# Patient Record
Sex: MALE | Race: WHITE | Employment: STUDENT | URBAN - METROPOLITAN AREA
[De-identification: names, ages, dates, MRNs, and addresses within clinical notes are randomized per-mention and may not be internally consistent; named-entity substitution may affect disease eponyms.]

---
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
## 2021-04-03 ENCOUNTER — TELEPHONE (OUTPATIENT)
Dept: ALLERGY | Facility: CLINIC | Age: 21
End: 2021-04-03

## 2021-04-03 NOTE — TELEPHONE ENCOUNTER
Refill requested for   Montelukast Sodium (SINGULAIR) 10 MG Oral Tab 30 tablet 0 12/23/2020    Sig:   Take 1 tablet (10 mg total) by mouth nightly.      Route:   Oral         Last office visit: 12/23/20    Previously advised to follow up in:  Please clarify

## 2021-04-05 RX ORDER — MONTELUKAST SODIUM 10 MG/1
10 TABLET ORAL NIGHTLY
Qty: 30 TABLET | Refills: 0 | Status: SHIPPED | OUTPATIENT
Start: 2021-04-05 | End: 2021-05-07

## 2021-04-05 NOTE — TELEPHONE ENCOUNTER
Follow up scheduled for June 1, 2021 when patient returns from school. Notified he needs referral. Will request from PCP.

## 2021-05-07 RX ORDER — MONTELUKAST SODIUM 10 MG/1
TABLET ORAL
Qty: 30 TABLET | Refills: 0 | Status: SHIPPED | OUTPATIENT
Start: 2021-05-07 | End: 2021-06-01

## 2021-05-07 NOTE — TELEPHONE ENCOUNTER
Refill requested for:   Name from pharmacy: MONTELUKAST SOD 10 MG TABLET         Will file in chart as: MONTELUKAST SODIUM 10 MG Oral Tab    Sig: TAKE 1 TABLET BY MOUTH EVERY DAY AT NIGHT    Disp:  30 tablet    Refills:  0 (Pharmacy requested: Not specifie

## 2021-05-11 ENCOUNTER — OFFICE VISIT (OUTPATIENT)
Dept: FAMILY MEDICINE CLINIC | Facility: CLINIC | Age: 21
End: 2021-05-11

## 2021-05-11 VITALS
TEMPERATURE: 98 F | HEART RATE: 70 BPM | DIASTOLIC BLOOD PRESSURE: 68 MMHG | BODY MASS INDEX: 20.19 KG/M2 | SYSTOLIC BLOOD PRESSURE: 120 MMHG | HEIGHT: 70.5 IN | WEIGHT: 142.63 LBS

## 2021-05-11 DIAGNOSIS — Z00.00 ADULT GENERAL MEDICAL EXAM: Primary | ICD-10-CM

## 2021-05-11 DIAGNOSIS — Z23 NEED FOR VACCINATION: ICD-10-CM

## 2021-05-11 DIAGNOSIS — J30.1 ALLERGIC RHINITIS DUE TO POLLEN, UNSPECIFIED SEASONALITY: ICD-10-CM

## 2021-05-11 DIAGNOSIS — L70.0 ACNE VULGARIS: ICD-10-CM

## 2021-05-11 PROCEDURE — 99395 PREV VISIT EST AGE 18-39: CPT | Performed by: FAMILY MEDICINE

## 2021-05-11 PROCEDURE — 90471 IMMUNIZATION ADMIN: CPT | Performed by: FAMILY MEDICINE

## 2021-05-11 PROCEDURE — 90620 MENB-4C VACCINE IM: CPT | Performed by: FAMILY MEDICINE

## 2021-05-11 PROCEDURE — 3074F SYST BP LT 130 MM HG: CPT | Performed by: FAMILY MEDICINE

## 2021-05-11 PROCEDURE — 3078F DIAST BP <80 MM HG: CPT | Performed by: FAMILY MEDICINE

## 2021-05-11 PROCEDURE — 3008F BODY MASS INDEX DOCD: CPT | Performed by: FAMILY MEDICINE

## 2021-05-11 RX ORDER — ERYTHROMYCIN AND BENZOYL PEROXIDE 30; 50 MG/G; MG/G
1 GEL TOPICAL 2 TIMES DAILY
Qty: 46 G | Refills: 0 | Status: SHIPPED | OUTPATIENT
Start: 2021-05-11 | End: 2022-05-06

## 2021-05-11 RX ORDER — FLUTICASONE PROPIONATE 50 MCG
2 SPRAY, SUSPENSION (ML) NASAL DAILY
Qty: 3 BOTTLE | Refills: 1 | Status: SHIPPED | OUTPATIENT
Start: 2021-05-11 | End: 2021-06-01

## 2021-05-11 RX ORDER — MONTELUKAST SODIUM 10 MG/1
10 TABLET ORAL NIGHTLY
Qty: 90 TABLET | Refills: 1 | Status: SHIPPED | OUTPATIENT
Start: 2021-05-11 | End: 2021-06-01

## 2021-05-11 NOTE — PROGRESS NOTES
Patient ID: Kit Grossman is a 24year old male. HPI  Patient presents with:  Routine Physical  Referral: Dermatology and Allergy    Last physical on 5/15/2020. Pt presents today with his father.      Pt is transferring from the [address] [place] [address] 6 Encounters:  05/11/21 : 20.17 kg/m² (16 %, Z= -1.01)*  12/23/20 : 21.51 kg/m² (36 %, Z= -0.37)*  05/15/20 : 20.77 kg/m² (30 %, Z= -0.53)*  05/20/19 : 18.24 kg/m² (7 %, Z= -1.49)*  03/18/19 : 19.21 kg/m² (18 %, Z= -0.92)*  07/19/18 : 17.59 kg/m² (6 %, Z= Transportation Needs:       Lack of Transportation (Medical):       Lack of Transportation (Non-Medical):   Physical Activity:       Days of Exercise per Week:       Minutes of Exercise per Session:   Stress:       Feeling of Stress :   Social Connection distress. Abdominal: Soft. Bowel sounds are normal. There is no hepatosplenomegaly. There is no tenderness. Lymphadenopathy: He has no cervical adenopathy. Neurological: He is alert and oriented to person, place, and time. He has normal reflexes.  No Referral Type:OFFICE VISIT          Referred to Maggie Boyd MD          Requested Specialty:DERMATOLOGY          Number of Visits Requested:3      [facility] 2019          Order Comments:              See DR Denia Benton (ALLERGIST)

## 2021-05-26 ENCOUNTER — TELEPHONE (OUTPATIENT)
Dept: FAMILY MEDICINE CLINIC | Facility: CLINIC | Age: 21
End: 2021-05-26

## 2021-06-01 ENCOUNTER — OFFICE VISIT (OUTPATIENT)
Dept: ALLERGY | Facility: CLINIC | Age: 21
End: 2021-06-01

## 2021-06-01 VITALS
SYSTOLIC BLOOD PRESSURE: 122 MMHG | HEART RATE: 62 BPM | WEIGHT: 144 LBS | BODY MASS INDEX: 20 KG/M2 | DIASTOLIC BLOOD PRESSURE: 73 MMHG

## 2021-06-01 DIAGNOSIS — J30.89 PERENNIAL ALLERGIC RHINITIS WITH SEASONAL VARIATION: Primary | ICD-10-CM

## 2021-06-01 DIAGNOSIS — J30.2 PERENNIAL ALLERGIC RHINITIS WITH SEASONAL VARIATION: Primary | ICD-10-CM

## 2021-06-01 DIAGNOSIS — J30.89 ENVIRONMENTAL AND SEASONAL ALLERGIES: ICD-10-CM

## 2021-06-01 DIAGNOSIS — J30.1 ALLERGIC RHINITIS DUE TO POLLEN, UNSPECIFIED SEASONALITY: ICD-10-CM

## 2021-06-01 PROCEDURE — 3074F SYST BP LT 130 MM HG: CPT | Performed by: ALLERGY & IMMUNOLOGY

## 2021-06-01 PROCEDURE — 99213 OFFICE O/P EST LOW 20 MIN: CPT | Performed by: ALLERGY & IMMUNOLOGY

## 2021-06-01 PROCEDURE — 3078F DIAST BP <80 MM HG: CPT | Performed by: ALLERGY & IMMUNOLOGY

## 2021-06-01 RX ORDER — MONTELUKAST SODIUM 10 MG/1
10 TABLET ORAL NIGHTLY
Qty: 90 TABLET | Refills: 1 | Status: SHIPPED | OUTPATIENT
Start: 2021-06-01 | End: 2021-11-28

## 2021-06-01 RX ORDER — FLUTICASONE PROPIONATE 50 MCG
2 SPRAY, SUSPENSION (ML) NASAL DAILY
Qty: 3 EACH | Refills: 3 | Status: SHIPPED | OUTPATIENT
Start: 2021-06-01

## 2021-06-01 NOTE — PATIENT INSTRUCTIONS
Recs:  Continue with Singulair, montelukast 10 mg once a day. Denies side effects. Reviewed FDA warning  Add Flonase or Nasacort 2 sprays per nostril once a day.   May take up to 3 to 5 days to take full effect and should be used daily during peak allergy

## 2021-06-01 NOTE — PROGRESS NOTES
Nely Ceja is a 24year old male. HPI:   Patient presents with: Allergies: congestion/sneezing has been better with singulair     Patient is a 52-year-old male who presents for follow-up with a chief complaint of allergies.     Patient last seen irritability and lethargy  ENMT:  Negative for ear drainage, hearing loss.  See hpi   Eyes:  Negative for eye discharge and vision loss  Gastrointestinal:  Negative for abdominal pain, diarrhea and vomiting  Integumentary:  Negative for pruritus and rash  R were provided today, they were provided solely for patient education and training related to self administration of these medications. Teaching, instruction and sample was provided to the patient by myself.   Teaching included  a review of potential advers

## 2021-09-20 ENCOUNTER — TELEPHONE (OUTPATIENT)
Dept: FAMILY MEDICINE CLINIC | Facility: CLINIC | Age: 21
End: 2021-09-20

## 2021-09-20 NOTE — TELEPHONE ENCOUNTER
Spoke with pt's mom (not on hipaa),  verified. She stated pt is currently in [place] for school. Pt has cough, some sinus issue but his cough is getting worse. Pt is vaccinated and can't miss his classes.   She wants to know if pt can have a video w

## 2022-03-12 ENCOUNTER — OFFICE VISIT (OUTPATIENT)
Dept: FAMILY MEDICINE CLINIC | Facility: CLINIC | Age: 22
End: 2022-03-12
Payer: COMMERCIAL

## 2022-03-12 VITALS
HEIGHT: 70.5 IN | BODY MASS INDEX: 20.9 KG/M2 | WEIGHT: 147.63 LBS | TEMPERATURE: 97 F | HEART RATE: 75 BPM | SYSTOLIC BLOOD PRESSURE: 126 MMHG | DIASTOLIC BLOOD PRESSURE: 80 MMHG

## 2022-03-12 DIAGNOSIS — H10.13 ALLERGIC CONJUNCTIVITIS OF BOTH EYES: ICD-10-CM

## 2022-03-12 DIAGNOSIS — J30.89 OTHER ALLERGIC RHINITIS: ICD-10-CM

## 2022-03-12 DIAGNOSIS — J32.9 SINUSITIS, UNSPECIFIED CHRONICITY, UNSPECIFIED LOCATION: Primary | ICD-10-CM

## 2022-03-12 DIAGNOSIS — R05.9 COUGH: ICD-10-CM

## 2022-03-12 PROCEDURE — 3079F DIAST BP 80-89 MM HG: CPT | Performed by: FAMILY MEDICINE

## 2022-03-12 PROCEDURE — 3008F BODY MASS INDEX DOCD: CPT | Performed by: FAMILY MEDICINE

## 2022-03-12 PROCEDURE — 3074F SYST BP LT 130 MM HG: CPT | Performed by: FAMILY MEDICINE

## 2022-03-12 PROCEDURE — 99214 OFFICE O/P EST MOD 30 MIN: CPT | Performed by: FAMILY MEDICINE

## 2022-03-12 RX ORDER — AMOXICILLIN AND CLAVULANATE POTASSIUM 875; 125 MG/1; MG/1
1 TABLET, FILM COATED ORAL 2 TIMES DAILY
Qty: 20 TABLET | Refills: 0 | Status: SHIPPED | OUTPATIENT
Start: 2022-03-12 | End: 2022-03-22

## 2022-03-15 ENCOUNTER — OFFICE VISIT (OUTPATIENT)
Dept: ALLERGY | Facility: CLINIC | Age: 22
End: 2022-03-15
Payer: COMMERCIAL

## 2022-03-15 VITALS
OXYGEN SATURATION: 97 % | DIASTOLIC BLOOD PRESSURE: 75 MMHG | RESPIRATION RATE: 20 BRPM | SYSTOLIC BLOOD PRESSURE: 120 MMHG | HEART RATE: 61 BPM

## 2022-03-15 DIAGNOSIS — J30.1 ALLERGIC RHINITIS DUE TO POLLEN, UNSPECIFIED SEASONALITY: ICD-10-CM

## 2022-03-15 DIAGNOSIS — Z92.29 COVID-19 VACCINE SERIES COMPLETED: ICD-10-CM

## 2022-03-15 DIAGNOSIS — J30.2 PERENNIAL ALLERGIC RHINITIS WITH SEASONAL VARIATION: Primary | ICD-10-CM

## 2022-03-15 DIAGNOSIS — J30.89 PERENNIAL ALLERGIC RHINITIS WITH SEASONAL VARIATION: Primary | ICD-10-CM

## 2022-03-15 DIAGNOSIS — Z23 FLU VACCINE NEED: ICD-10-CM

## 2022-03-15 PROCEDURE — 99214 OFFICE O/P EST MOD 30 MIN: CPT | Performed by: ALLERGY & IMMUNOLOGY

## 2022-03-15 PROCEDURE — 3074F SYST BP LT 130 MM HG: CPT | Performed by: ALLERGY & IMMUNOLOGY

## 2022-03-15 PROCEDURE — 3078F DIAST BP <80 MM HG: CPT | Performed by: ALLERGY & IMMUNOLOGY

## 2022-03-15 RX ORDER — MONTELUKAST SODIUM 10 MG/1
TABLET ORAL
COMMUNITY
Start: 2022-03-14

## 2022-03-15 RX ORDER — LEVOCETIRIZINE DIHYDROCHLORIDE 5 MG/1
5 TABLET, FILM COATED ORAL NIGHTLY
Qty: 90 TABLET | Refills: 1 | Status: SHIPPED | OUTPATIENT
Start: 2022-03-15

## 2022-05-03 PROBLEM — F41.9 ANXIETY: Status: ACTIVE | Noted: 2022-05-03

## 2022-05-03 PROBLEM — F32.A DEPRESSIVE DISORDER: Status: ACTIVE | Noted: 2022-05-03

## 2022-05-03 PROBLEM — F51.5 NIGHTMARES: Status: ACTIVE | Noted: 2022-05-03

## 2022-05-24 ENCOUNTER — OFFICE VISIT (OUTPATIENT)
Dept: FAMILY MEDICINE CLINIC | Facility: CLINIC | Age: 22
End: 2022-05-24
Payer: COMMERCIAL

## 2022-05-24 VITALS
OXYGEN SATURATION: 98 % | HEART RATE: 70 BPM | TEMPERATURE: 98 F | WEIGHT: 149 LBS | HEIGHT: 69 IN | DIASTOLIC BLOOD PRESSURE: 66 MMHG | SYSTOLIC BLOOD PRESSURE: 118 MMHG | RESPIRATION RATE: 20 BRPM | BODY MASS INDEX: 22.07 KG/M2

## 2022-05-24 DIAGNOSIS — L23.7 CONTACT DERMATITIS DUE TO POISON SUMAC: Primary | ICD-10-CM

## 2022-05-24 PROCEDURE — 99202 OFFICE O/P NEW SF 15 MIN: CPT | Performed by: PHYSICIAN ASSISTANT

## 2022-05-24 PROCEDURE — 3008F BODY MASS INDEX DOCD: CPT | Performed by: PHYSICIAN ASSISTANT

## 2022-05-24 PROCEDURE — 3078F DIAST BP <80 MM HG: CPT | Performed by: PHYSICIAN ASSISTANT

## 2022-05-24 PROCEDURE — 3074F SYST BP LT 130 MM HG: CPT | Performed by: PHYSICIAN ASSISTANT

## 2022-05-24 RX ORDER — BETAMETHASONE DIPROPIONATE 0.05 %
OINTMENT (GRAM) TOPICAL
Qty: 15 G | Refills: 1 | Status: SHIPPED | OUTPATIENT
Start: 2022-05-24

## 2022-05-24 RX ORDER — PREDNISONE 20 MG/1
TABLET ORAL
Qty: 13 TABLET | Refills: 0 | Status: SHIPPED | OUTPATIENT
Start: 2022-05-24 | End: 2022-06-01

## 2022-06-21 RX ORDER — MONTELUKAST SODIUM 10 MG/1
TABLET ORAL
Qty: 90 TABLET | Refills: 1 | Status: SHIPPED | OUTPATIENT
Start: 2022-06-21

## 2022-07-21 RX ORDER — METHYLPHENIDATE HYDROCHLORIDE 27 MG/1
27 TABLET ORAL DAILY
Qty: 30 TABLET | Refills: 0 | Status: SHIPPED | OUTPATIENT
Start: 2022-07-21 | End: 2022-08-20

## 2022-07-24 DIAGNOSIS — F51.5 NIGHTMARES: ICD-10-CM

## 2022-07-24 DIAGNOSIS — F41.9 ANXIETY: ICD-10-CM

## 2022-07-24 DIAGNOSIS — F32.A DEPRESSIVE DISORDER: ICD-10-CM

## 2022-07-25 RX ORDER — ESCITALOPRAM OXALATE 10 MG/1
10 TABLET ORAL DAILY
Qty: 90 TABLET | Refills: 0 | Status: SHIPPED | OUTPATIENT
Start: 2022-07-25

## 2022-09-02 ENCOUNTER — TELEPHONE (OUTPATIENT)
Dept: FAMILY MEDICINE CLINIC | Facility: CLINIC | Age: 22
End: 2022-09-02

## 2022-09-02 NOTE — TELEPHONE ENCOUNTER
Patient will be seeing psych at school. They need medical records to continue medication management. Madalyn Zhang He is Out of state; and needs to be on ADHD medication.     Transferred to medical records to assist.

## 2022-09-23 RX ORDER — METHYLPHENIDATE HYDROCHLORIDE 36 MG/1
TABLET, EXTENDED RELEASE ORAL
Qty: 30 TABLET | Refills: 0 | Status: CANCELLED | OUTPATIENT
Start: 2022-09-23 | End: 2022-10-23

## 2022-09-23 RX ORDER — METHYLPHENIDATE HYDROCHLORIDE 36 MG/1
36 TABLET ORAL EVERY MORNING
Qty: 30 TABLET | Refills: 0 | Status: CANCELLED | OUTPATIENT
Start: 2022-09-23

## 2022-09-23 NOTE — TELEPHONE ENCOUNTER
Patient called asking for a refill on methylphenidate. He is currently in college in [place]. He tried to see a doctor at his school but they are requesting he be re-tested for ADHD and also has to pass a drug test. He said he's a medical marijuana user and also doesn't know why he needs to be re-tested if he has provided them with records that he has ADHD. He is asking if he can get a refill.

## 2022-09-25 RX ORDER — METHYLPHENIDATE HYDROCHLORIDE 36 MG/1
36 TABLET ORAL DAILY
Qty: 30 TABLET | Refills: 0 | OUTPATIENT
Start: 2022-11-24 | End: 2022-12-24

## 2022-09-25 RX ORDER — METHYLPHENIDATE HYDROCHLORIDE 36 MG/1
36 TABLET ORAL DAILY
Qty: 30 TABLET | Refills: 0 | OUTPATIENT
Start: 2022-10-24 | End: 2022-11-23

## 2022-09-25 RX ORDER — METHYLPHENIDATE HYDROCHLORIDE 36 MG/1
36 TABLET ORAL DAILY
Qty: 30 TABLET | Refills: 0 | OUTPATIENT
Start: 2022-09-25 | End: 2022-10-25

## 2022-09-25 NOTE — TELEPHONE ENCOUNTER
Cannot send this across state lines as this is a controlled substance. Usually being a marijuana user does not preclude you from being on ADHD medications.

## 2022-09-26 NOTE — TELEPHONE ENCOUNTER
Patient has read [employer] 9/26/22.        recommendation  Message [id number]  From   Marisa Valderrama RN To   Dylan Jamafaby and Delivered   9/26/2022 12:21 PM   Last Read in [employer]   9/26/2022 12:25 PM by Leland Irwin

## 2022-10-20 DIAGNOSIS — F41.9 ANXIETY: ICD-10-CM

## 2022-10-20 DIAGNOSIS — F32.A DEPRESSIVE DISORDER: ICD-10-CM

## 2022-10-20 DIAGNOSIS — F51.5 NIGHTMARES: ICD-10-CM

## 2022-10-20 RX ORDER — ESCITALOPRAM OXALATE 10 MG/1
10 TABLET ORAL DAILY
Qty: 90 TABLET | Refills: 0 | OUTPATIENT
Start: 2022-10-20

## 2022-12-29 ENCOUNTER — TELEMEDICINE (OUTPATIENT)
Dept: FAMILY MEDICINE CLINIC | Facility: CLINIC | Age: 22
End: 2022-12-29
Payer: COMMERCIAL

## 2022-12-29 DIAGNOSIS — F41.9 ANXIETY: ICD-10-CM

## 2022-12-29 DIAGNOSIS — F90.2 ATTENTION DEFICIT HYPERACTIVITY DISORDER (ADHD), COMBINED TYPE: ICD-10-CM

## 2022-12-29 DIAGNOSIS — F12.90 MARIJUANA USE: ICD-10-CM

## 2022-12-29 DIAGNOSIS — F32.A DEPRESSIVE DISORDER: Primary | ICD-10-CM

## 2022-12-29 PROCEDURE — 99214 OFFICE O/P EST MOD 30 MIN: CPT | Performed by: FAMILY MEDICINE

## 2022-12-29 RX ORDER — METHYLPHENIDATE HYDROCHLORIDE 54 MG/1
54 TABLET ORAL DAILY
Qty: 30 TABLET | Refills: 0 | Status: SHIPPED | OUTPATIENT
Start: 2023-03-01 | End: 2023-03-31

## 2022-12-29 RX ORDER — METHYLPHENIDATE HYDROCHLORIDE 54 MG/1
54 TABLET ORAL DAILY
Qty: 30 TABLET | Refills: 0 | Status: SHIPPED | OUTPATIENT
Start: 2022-12-29 | End: 2023-01-28

## 2022-12-29 RX ORDER — METHYLPHENIDATE HYDROCHLORIDE 54 MG/1
54 TABLET ORAL DAILY
Qty: 30 TABLET | Refills: 0 | Status: SHIPPED | OUTPATIENT
Start: 2023-01-29 | End: 2023-02-28

## 2023-02-20 RX ORDER — MONTELUKAST SODIUM 10 MG/1
10 TABLET ORAL NIGHTLY
Qty: 90 TABLET | Refills: 0 | Status: SHIPPED | OUTPATIENT
Start: 2023-02-20

## 2023-02-20 RX ORDER — LEVOCETIRIZINE DIHYDROCHLORIDE 5 MG/1
5 TABLET, FILM COATED ORAL NIGHTLY
Qty: 90 TABLET | Refills: 0 | Status: SHIPPED | OUTPATIENT
Start: 2023-02-20

## 2023-02-20 NOTE — TELEPHONE ENCOUNTER
Spoke to patient. Virtual appointment scheduled while he is in town for spring break. Pt requested Singulair Rx refill as well. RN informed patient 90 day supply for both medications are approved. Additional refills will be addressed at follow up visit on March 13th.

## 2023-03-13 ENCOUNTER — TELEMEDICINE (OUTPATIENT)
Dept: ALLERGY | Facility: CLINIC | Age: 23
End: 2023-03-13

## 2023-03-13 DIAGNOSIS — J30.2 PERENNIAL ALLERGIC RHINITIS WITH SEASONAL VARIATION: Primary | ICD-10-CM

## 2023-03-13 DIAGNOSIS — Z23 FLU VACCINE NEED: ICD-10-CM

## 2023-03-13 DIAGNOSIS — J30.89 PERENNIAL ALLERGIC RHINITIS WITH SEASONAL VARIATION: Primary | ICD-10-CM

## 2023-03-13 DIAGNOSIS — Z92.29 COVID-19 VACCINE SERIES COMPLETED: ICD-10-CM

## 2023-05-20 RX ORDER — LEVOCETIRIZINE DIHYDROCHLORIDE 5 MG/1
TABLET, FILM COATED ORAL
Qty: 90 TABLET | Refills: 0 | Status: SHIPPED | OUTPATIENT
Start: 2023-05-20

## 2023-06-17 RX ORDER — LEVOCETIRIZINE DIHYDROCHLORIDE 5 MG/1
5 TABLET, FILM COATED ORAL NIGHTLY
Qty: 90 TABLET | Refills: 0 | Status: SHIPPED | OUTPATIENT
Start: 2023-06-17

## 2023-07-12 RX ORDER — MONTELUKAST SODIUM 10 MG/1
10 TABLET ORAL NIGHTLY
Qty: 90 TABLET | Refills: 0 | Status: SHIPPED | OUTPATIENT
Start: 2023-07-12

## 2023-07-12 NOTE — TELEPHONE ENCOUNTER
Requested Prescriptions   Pending Prescriptions Disp Refills    montelukast 10 MG Oral Tab 90 tablet 0     Sig: Take 1 tablet (10 mg total) by mouth nightly.        Corticosteroids / Long-Acting Bronchodilators Passed - 7/11/2023  7:09 PM        Passed - Appt in past 6 mos or next 3 mos     Recent Outpatient Visits              4 months ago Perennial allergic rhinitis with seasonal variation    [facility], [address] [place] [address], Jose Marie MD    Telemedicine    6 months ago Depressive disorder    [facility], [place] [address], [facility],     Telemedicine    1 year ago [address] [place] [address], [facility]    Telemedicine    1 year ago Attention deficit hyperactivity disorder (ADHD), combined type    [facility], [place] [address], [facility],     Telemedicine    1 year ago Contact dermatitis due to [facility], [facility], [facility]    Office Visit

## 2023-09-12 RX ORDER — LEVOCETIRIZINE DIHYDROCHLORIDE 5 MG/1
5 TABLET, FILM COATED ORAL NIGHTLY
Qty: 90 TABLET | Refills: 0 | Status: SHIPPED | OUTPATIENT
Start: 2023-09-12

## 2023-10-11 RX ORDER — MONTELUKAST SODIUM 10 MG/1
10 TABLET ORAL NIGHTLY
Qty: 90 TABLET | Refills: 0 | Status: SHIPPED | OUTPATIENT
Start: 2023-10-11

## 2023-10-11 NOTE — TELEPHONE ENCOUNTER
Refill requested for   Requested Prescriptions   Pending Prescriptions Disp Refills    MONTELUKAST 10 MG Oral Tab [Pharmacy Med Name: MONTELUKAST SOD 10 MG TABLET] 90 tablet 0     Sig: TAKE 1 TABLET BY MOUTH EVERY DAY AT NIGHT                    Recent Outpatient Visits              7 months ago Perennial allergic rhinitis with seasonal variation    [facility], [address] [place] [address], Emily Emmanuel MD    Telemedicine    9 months ago Depressive disorder    [facility], [place] [address], [facility], DO    Telemedicine    1 year ago [address] [place] [address], [facility],     Telemedicine    1 year ago Attention deficit hyperactivity disorder (ADHD), combined type    [facility], [place] [address], [facility], DO    Telemedicine    1 year ago Contact dermatitis due to [address] [facility], [address] [place] [address], Rhonda Palacio PA-C    Office Visit                          Last office visit: 3/13/23    Previously advised to follow up in 1 year or sooner if needed    F/U currently scheduled?  Not at this time    ACTION: Refill filled - patient ok to follow-up in 1 year

## 2023-10-24 RX ORDER — MONTELUKAST SODIUM 10 MG/1
10 TABLET ORAL NIGHTLY
Qty: 90 TABLET | Refills: 0 | Status: CANCELLED | OUTPATIENT
Start: 2023-10-24

## 2023-10-25 RX ORDER — LEVOCETIRIZINE DIHYDROCHLORIDE 5 MG/1
5 TABLET, FILM COATED ORAL NIGHTLY
Qty: 90 TABLET | Refills: 0 | Status: SHIPPED | OUTPATIENT
Start: 2023-10-25

## 2023-10-25 NOTE — TELEPHONE ENCOUNTER
Refill for Singulair filled on 10/11/23 for 90 days, denied per protocol for duplicate refill  LOV 9/92/80  Patient to follow-up in 1 year  Refill for levocetirizine filled per protocol

## 2024-05-24 NOTE — TELEPHONE ENCOUNTER
Refill requested for: Levocetirizine    Last office visit: 3/13/2023    Previously advised to follow up in 1 year or sooner if needed    F/U currently scheduled? No      Date of last refill: 10/25/2023    ACTION: RN tried calling pt to notify him that he is overdue for yearly follow up and will need appointment scheduled in order for us to be able to send refill.  Went straight to flatev.  Eat message sent.

## 2024-05-28 NOTE — TELEPHONE ENCOUNTER
See other 5/23/2024 Allergy Telephone Encounter.     Message was left on patient's voicemail to call the Allergy Office as appointment is needed.     See [employer] message sent to patient today.     Ilan Bauer,      The Allergy Office received a refill request for Monelukast and Levocetirizine.       Due to Dr. Stout's refill protocol, we are unable to refill these medications unless you have a future scheduled Allergy Physician Follow-Up Appointment with Dr. Stout.      Please call the Allergy Office at [phone] to schedule the appointment or schedule via [employer].      Once a future appointment is scheduled, a nurse will be able to send the refills.         Regards,     Rae KIRKLAND RN          Patient last seen in Allergy 3/13/2023 for . . .    Perennial allergic rhinitis with seasonal variation  (primary encounter diagnosis)  Covid-19 vaccine series completed  Flu vaccine need      Requested Prescriptions   Pending Prescriptions Disp Refills    montelukast 10 MG Oral Tab 90 tablet 0     Sig: Take 1 tablet (10 mg total) by mouth nightly.       Corticosteroids / Long-Acting Bronchodilators Failed - 5/23/2024  3:35 PM        Failed - Appt in past 6 mos or next 3 mos     Recent Outpatient Visits              1 year ago Perennial allergic rhinitis with seasonal variation    [facility], [place] Guy Stout MD    Telemedicine    1 year ago [facility]     [facility]    1 year ago Anxiety    [facility] Gio Ceron,     Telemedicine    1 year ago Attention deficit hyperactivity disorder (ADHD), combined type    [facility],     Telemedicine    2 years ago Contact dermatitis due to poison sumac    [facility], [facility] Patria Wong PA-C     Office Visit

## 2024-06-03 RX ORDER — LEVOCETIRIZINE DIHYDROCHLORIDE 5 MG/1
5 TABLET, FILM COATED ORAL NIGHTLY
Qty: 90 TABLET | Refills: 0 | OUTPATIENT
Start: 2024-06-03

## 2024-06-03 NOTE — TELEPHONE ENCOUNTER
RN left voicemail for patient in regards to refill request  Patient overdue for a follow-up visit   Third attempt to contact patient with no response   Refill denied per protocol

## 2024-06-04 RX ORDER — MONTELUKAST SODIUM 10 MG/1
10 TABLET ORAL NIGHTLY
Qty: 90 TABLET | Refills: 0 | OUTPATIENT
Start: 2024-06-04

## 2024-06-04 NOTE — TELEPHONE ENCOUNTER
Last read by Juancarlos Patel at  4:31 PM on 5/30/2024.   No appt scheduled at this time. Deny Rx per protocol.

## 2024-06-11 RX ORDER — LEVOCETIRIZINE DIHYDROCHLORIDE 5 MG/1
5 TABLET, FILM COATED ORAL NIGHTLY
Qty: 90 TABLET | Refills: 0 | Status: SHIPPED | OUTPATIENT
Start: 2024-06-11

## 2024-06-11 RX ORDER — MONTELUKAST SODIUM 10 MG/1
10 TABLET ORAL NIGHTLY
Qty: 90 TABLET | Refills: 0 | Status: SHIPPED | OUTPATIENT
Start: 2024-06-11

## 2024-06-11 NOTE — TELEPHONE ENCOUNTER
Received a refill request for Singulair 10 mg - 1 tablet by mouth daily and Xyzal 5 mg- 1 tablet by mouth daily.    Last office visit was 3/13/23 for allergies.    Patient has an appointment scheduled for 12/18/24.    Refill meets protocol- refilled.

## 2024-11-04 RX ORDER — LEVOCETIRIZINE DIHYDROCHLORIDE 5 MG/1
5 TABLET, FILM COATED ORAL NIGHTLY
Qty: 90 TABLET | Refills: 0 | Status: SHIPPED | OUTPATIENT
Start: 2024-11-04

## 2024-11-04 RX ORDER — MONTELUKAST SODIUM 10 MG/1
10 TABLET ORAL NIGHTLY
Qty: 90 TABLET | Refills: 0 | Status: SHIPPED | OUTPATIENT
Start: 2024-11-04

## 2024-11-04 NOTE — TELEPHONE ENCOUNTER
Prescription sent.  No further refills till patient is seen for follow-up.  Patient last seen in March 2023

## 2024-11-04 NOTE — TELEPHONE ENCOUNTER
Dr. Stout please review refill request below  Patient requesting refills for montelukast and levocetirizine   Last office visit 3/13/23  Has follow-up scheduled for 12/18/24  Patient also requesting medication be sent to [place] pharmacy  Medications are pended below to request pharmacy if acceptable       Refill requested for   Requested Prescriptions   Pending Prescriptions Disp Refills    montelukast 10 MG Oral Tab 90 tablet 0     Sig: Take 1 tablet (10 mg total) by mouth nightly.       Corticosteroids / Long-Acting Bronchodilators Passed - 11/4/2024  7:39 AM        Passed - Appt in past 6 mos or next 3 mos     Recent Outpatient Visits              1 year ago Perennial allergic rhinitis with seasonal variation    [facility] Guy Stout MD    Telemedicine    1 year ago Depressive disorder    [facility] Gio Ceron,     Telemedicine    2 years ago Anxiety    [facility] Gio Ceron,     Telemedicine    2 years ago Attention deficit hyperactivity disorder (ADHD), combined type    [facility] Gio Ceron,     Telemedicine    2 years ago Contact dermatitis due to poison sumac    [facility]    Office Visit          Future Appointments         Provider Department Appt Notes    In 1 month Guy Stout MD [facility] follow up                      levocetirizine 5 MG Oral Tab 90 tablet 0     Sig: Take 1 tablet (5 mg total) by mouth nightly.       Antihistamines Failed - 11/4/2024  7:39 AM        Failed - Appt in past 12 mos or next 1 mos     Recent Outpatient Visits              1 year ago Perennial allergic rhinitis with seasonal variation    [facility]out  MD M[facility]    Telemedicine    1 year ago Depressive disorder    [facility] [address], Gio [place], DO    Telemedicine    2 years ago Anxiety    [facility] [address], Gio [place], DO    Telemedicine    2 years ago Attention deficit hyperactivity disorder (ADHD), combined type    [facility] [address], [place]    [place]    2 years ago Contact dermatitis due to poison sumac    [facility]    Office Visit          Future Appointments         Provider Department Appt Notes    In 1 month Guy Stout MD [facility] follow up                          Last office visit: 3/13/23    Previously advised to follow up in 1 year or sooner if needed    F/U currently scheduled? 12/18/24    ACTION: Routed to Dr. Stout for review

## 2024-12-18 ENCOUNTER — OFFICE VISIT (OUTPATIENT)
Dept: ALLERGY | Facility: CLINIC | Age: 24
End: 2024-12-18
Payer: COMMERCIAL

## 2024-12-18 VITALS — WEIGHT: 160 LBS | BODY MASS INDEX: 23.7 KG/M2 | HEIGHT: 69 IN

## 2024-12-18 DIAGNOSIS — Z23 NEED FOR COVID-19 VACCINE: ICD-10-CM

## 2024-12-18 DIAGNOSIS — F90.2 ATTENTION DEFICIT HYPERACTIVITY DISORDER (ADHD), COMBINED TYPE: ICD-10-CM

## 2024-12-18 DIAGNOSIS — J30.2 SEASONAL AND PERENNIAL ALLERGIC RHINOCONJUNCTIVITIS: Primary | ICD-10-CM

## 2024-12-18 DIAGNOSIS — J30.89 SEASONAL AND PERENNIAL ALLERGIC RHINOCONJUNCTIVITIS: Primary | ICD-10-CM

## 2024-12-18 DIAGNOSIS — Z23 FLU VACCINE NEED: ICD-10-CM

## 2024-12-18 DIAGNOSIS — H10.10 SEASONAL AND PERENNIAL ALLERGIC RHINOCONJUNCTIVITIS: Primary | ICD-10-CM

## 2024-12-18 PROCEDURE — 3008F BODY MASS INDEX DOCD: CPT | Performed by: ALLERGY & IMMUNOLOGY

## 2024-12-18 PROCEDURE — 99214 OFFICE O/P EST MOD 30 MIN: CPT | Performed by: ALLERGY & IMMUNOLOGY

## 2024-12-18 RX ORDER — MONTELUKAST SODIUM 10 MG/1
10 TABLET ORAL NIGHTLY
Qty: 90 TABLET | Refills: 0 | Status: SHIPPED | OUTPATIENT
Start: 2024-12-18

## 2024-12-18 RX ORDER — LEVOCETIRIZINE DIHYDROCHLORIDE 5 MG/1
5 TABLET, FILM COATED ORAL NIGHTLY
Qty: 90 TABLET | Refills: 2 | Status: SHIPPED | OUTPATIENT
Start: 2024-12-18

## 2024-12-18 NOTE — PROGRESS NOTES
Juancarlos Patel is a 24 year old male.    HPI:   No chief complaint on file.    Patient is a 24-year-old male who presents for follow-up with a chief complaint of allergies  Patient last seen by me in March 2023  History of allergic rhinitis with prior skin testing being positive to trees ragweed dust mite cats and dogs  At last visit 1 dog in the home  Medications listed include Xyzal Singulair Flonase    Immunizations reviewed.  COVID-vaccine x 1 dose in 2021  Last flu vaccine on record from 2016.    Today patient reports    Ar:  Active or persistent symptoms: doing well with meds , happy with control   Active meds Xyzal Singulair   Denies side effects or adverse reactions to above medicines  pets 1 dog     [employer]       Smokes MJ prn       Defers flu shot       HISTORY:  Past Medical History:    Allergic rhinitis    HSP (Henoch Schonlein purpura) ([facility])    no kidney involvement      No past surgical history on file.   Family History   Problem Relation Age of Onset    Allergies Mother     Allergies Father     Diabetes Brother     Heart Disorder Neg     Hypertension Neg       Social History:   Social History     Socioeconomic History    Marital status: Single   Tobacco Use    Smoking status: Never    Smokeless tobacco: Never   Substance and Sexual Activity    Alcohol use: Yes     Alcohol/week: 0.0 standard drinks of alcohol    Drug use: Never   Other Topics Concern    Reaction to local anesthetic No        Medications (Active prior to today's visit):  Current Outpatient Medications   Medication Sig Dispense Refill    montelukast 10 MG Oral Tab Take 1 tablet (10 mg total) by mouth nightly. 90 tablet 0    levocetirizine 5 MG Oral Tab Take 1 tablet (5 mg total) by mouth nightly. 90 tablet 0    methylphenidate ER (CONCERTA) 54 MG Oral Tab CR Take 1 tablet (54 mg total) by mouth every morning. 30 tablet 0    escitalopram 20 MG Oral Tab Take 1 tablet (20 mg total) by mouth every morning. 90 tablet 0     Fluticasone Propionate (FLONASE) 50 MCG/ACT Nasal Suspension 2 sprays by Nasal route daily. (Patient not taking: Reported on 5/3/2022) 3 each 3       Allergies:  Allergies[1]      ROS:   Allergic/Immuno:  See hpi  Cardiovascular:  Negative for irregular heartbeat/palpitations, chest pain, edema  Constitutional:  Negative night sweats,weight loss, irritability and lethargy  ENMT:  Negative for ear drainage, hearing loss and nasal drainage  Eyes:  Negative for eye discharge and vision loss  Gastrointestinal:  Negative for abdominal pain, diarrhea and vomiting  Integumentary:  Negative for pruritus and rash  Respiratory:  Negative for cough, dyspnea and wheezing    PHYSICAL EXAM:   Constitutional: responsive, no acute distress noted  Head/Face: NC/Atraumatic  Eyes/Vision: conjunctiva and lids are normal extraocular motion is intact   Ears/Audiometry: tympanic membranes are normal bilaterally hearing is grossly intact  Nose/Mouth/Throat: nose and throat are clear mucous membranes are moist   Neck/Thyroid: neck is supple without adenopathy  Lymphatic: no abnormal cervical, supraclavicular or axillary adenopathy is noted  Respiratory: normal to inspection lungs are clear to auscultation bilaterally normal respiratory effort   Cardiovascular: regular rate and rhythm no murmurs, gallups, or rubs  Abdomen: soft non-tender non-distended  Skin/Hair: no unusual rashes present   Extremities: no edema, cyanosis, or clubbing     ASSESSMENT/PLAN:   Assessment   Encounter Diagnoses   Name Primary?    Seasonal and perennial allergic rhinoconjunctivitis Yes    Flu vaccine need     Need for COVID-19 vaccine        #1 allergic rhinitis  Stable at this time with Singulair and Xyzal.  Patient denies side effects with either medication.  Reviewed FDA warning with Singulair.  May add Flonase 2 sprays per nostril once a day if refractory or increasing symptoms  Reviewed avoidance measures and potential treatment option immunotherapy      2.   Flu vaccine recommended and offered    Patient defers    3.  COVID-vaccine booster recommended.  Please check with local pharmacy as we do not stock the vaccine in office  Most recent booster is in September 2024    I spent 30 minutes on the day of the encounter related to this visit, not including separately reported activities or procedures.  This may have included non face-to-face time activities such as same day chart review in preparing to see the patient, orders, documentation in the electronic medical record, and/or communication with other healthcare professionals or family members/caregivers.       Orders This Visit:  No orders of the defined types were placed in this encounter.      Meds This Visit:  Requested Prescriptions      No prescriptions requested or ordered in this encounter       Imaging & Referrals:  None     12/18/2024  Guy Stout MD    If medication samples were provided today, they were provided solely for patient education and training related to self administration of these medications.  Teaching, instruction and sample was provided to the patient by myself.  Teaching included  a review of potential adverse side effects as well as potential efficacy.  Patient's questions were answered in regards to medication administration and dosing and potential side effects. Teaching was provided via the teach back method           [1] No Known Allergies

## 2025-02-11 RX ORDER — LEVOCETIRIZINE DIHYDROCHLORIDE 5 MG/1
5 TABLET, FILM COATED ORAL NIGHTLY
Qty: 90 TABLET | Refills: 2 | Status: SHIPPED | OUTPATIENT
Start: 2025-02-11

## 2025-02-11 RX ORDER — MONTELUKAST SODIUM 10 MG/1
10 TABLET ORAL NIGHTLY
Qty: 90 TABLET | Refills: 0 | Status: SHIPPED | OUTPATIENT
Start: 2025-02-11

## 2025-02-11 NOTE — TELEPHONE ENCOUNTER
Refill requested for:  montelukast 10 MG Oral Tab          Sig: Take 1 tablet (10 mg total) by mouth nightly.    Disp: 90 tablet    Refills: 0    Start: 2/11/2025    Class: Normal    Non-formulary    Last ordered: 1 month ago (12/18/2024) by Guy Stout MD    Corticosteroids / Long-Acting Bronchodilators Passed 02/11/2025 02:58 PM   Protocol Details Appt in past 6 mos or next 3 mos    Medication is active on med list       levocetirizine 5 MG Oral Tab         Sig: Take 1 tablet (5 mg total) by mouth nightly.    Disp: 90 tablet    Refills: 2    Start: 2/11/2025    Class: Normal    Non-formulary    Last ordered: 1 month ago (12/18/2024) by Guy Stout MD    Antihistamines Passed 02/11/2025 02:58 PM   Protocol Details Appt in past 12 mos or next 1 mos    Medication is active on med list      To be filled at: [id number] [place], [place] - [address], [phone], [phone]       Last office visit: 12/18/2024    Previously advised to follow up in:1 year or sooner if needed    F/U currently scheduled? No      Date of last refill: 12/18/2024    ACTION: Refilled per protocol.

## 2025-05-22 ENCOUNTER — APPOINTMENT (OUTPATIENT)
Dept: OCCUPATIONAL MEDICINE | Age: 25
End: 2025-05-22
Attending: NURSE PRACTITIONER

## 2025-06-23 RX ORDER — LEVOCETIRIZINE DIHYDROCHLORIDE 5 MG/1
5 TABLET, FILM COATED ORAL NIGHTLY
Qty: 90 TABLET | Refills: 1 | Status: SHIPPED | OUTPATIENT
Start: 2025-06-23

## 2025-06-23 RX ORDER — MONTELUKAST SODIUM 10 MG/1
10 TABLET ORAL NIGHTLY
Qty: 90 TABLET | Refills: 0 | Status: SHIPPED | OUTPATIENT
Start: 2025-06-23

## 2025-06-23 NOTE — TELEPHONE ENCOUNTER
Received a refill request for Singulair 10 mg- 1 tablet by mouth daily and Xyzal 5mg- 1 tablet by mouth daily.    Last office visit was 12/18/24 for allergies.     Refill meets protocol- refilled.

## (undated) NOTE — LETTER
VACCINE ADMINISTRATION RECORD  PARENT / GUARDIAN APPROVAL  Date: 2019  Vaccine administered to: Douglas Harrell     : 2000    MRN: [id number]    A copy of the appropriate Centers for Disease Control and Prevention Vaccine Information statem

## (undated) NOTE — LETTER
State of [facility] 57 Examination       Student's Name  Sueellen Humphrey Birth Title                           Date     Signature                                                                                                                                              Title HEALTH HISTORY          TO BE COMPLETED AND SIGNED BY PARENT/GUARDIAN AND VERIFIED BY HEALTH CARE PROVIDER    ALLERGIES  (Food, drug, insect, other) MEDICATION  (List all prescribed or taken on a regular basis.)     Diagnosis of asthma?   Child wakes during (1.715 m)   Wt 118 lb 3.2 oz (53.6 kg)   BMI 18.24 kg/m²     DIABETES SCREENING  BMI>85% age/sex  No And any two of the following:  Family History No   Ethnic Minority  No          Signs of Insulin Resistance (hypertension, dyslipidemia, polycystic ovarian Currently Prescribed Asthma Medication:            Quick-relief  medication (e.g. Short Acting Beta Antagonist): No          Controller medication (e.g. inhaled corticosteroid):   No Other   NEEDS/MODIFICATIONS required in the school setting  None DIET

## (undated) NOTE — LETTER
State of [facility] 57 Examination       Student's Name  Jose Marks Birth Title                           Date     Signature                                                                                                                                              Title HEALTH HISTORY          TO BE COMPLETED AND SIGNED BY PARENT/GUARDIAN AND VERIFIED BY HEALTH CARE PROVIDER    ALLERGIES  (Food, drug, insect, other) MEDICATION  (List all prescribed or taken on a regular basis.)     Diagnosis of asthma?   Child wakes during (1.715 m)   Wt 134 lb 9.6 oz (61.1 kg)   BMI 20.77 kg/m²     DIABETES SCREENING  BMI>85% age/sex  No And any two of the following:  Family History No   Ethnic Minority  No          Signs of Insulin Resistance (hypertension, dyslipidemia, polycystic ovarian Currently Prescribed Asthma Medication:            Quick-relief  medication (e.g. Short Acting Beta Antagonist): No          Controller medication (e.g. inhaled corticosteroid):   No Other   NEEDS/MODIFICATIONS required in the school setting  None DIET

## (undated) NOTE — LETTER
12/15/21        [address]  [address]      Dear Jabari Aggarwal records indicate that you have outstanding lab work and or testing that was ordered for you and has not yet been completed:  Orders Placed This Encounter

## (undated) NOTE — LETTER
Abi Harmon Do  [address]  [address] [facility] [address]  [address] [facility], [address] [facility]       07/19/18        Patient: Brooks Paniagua   YOB: 2000   Date of Visit: 7/19/2018       Dear  Dr. Noelle Uriostegui DO,      Thank you for referring Brooks Paniagua to [facility]

## (undated) NOTE — LETTER
08/23/19    [address]  [address]      Dear Francis Hidden records indicate that you have outstanding lab work and or testing that was ordered for you and has not yet been completed:  Orders Placed This Encounter      CBC

## (undated) NOTE — MR AVS SNAPSHOT
[address]  [phone]               Thank you for choosing us for your health care visit with Frank Terry. DO Rebecca.   We are glad to serve you and happy to provide you with this sum Proxy Access to your child’s MyChart go to [email or web address] and click on the   Sign Up Forms link in the Additional Information box on the right. MyChart Questions? Call [phone] for help.   MyChart is NOT to be used for urgent needs o Limiting fast food, take out food, and eating out at restaurants  o Preparing foods at home as a family  o Eating a diet rich in calcium  o Eating a high fiber diet    Help your children form healthy habits.   Healthy active children are more likely to be

## (undated) NOTE — LETTER
Date & Time: 3/18/2019, 8:48 PM  Patient: Jerrell Hardy  Encounter Provider(s):    Jennifer Null MD       To Whom It May Concern:    Jerrell Hardy was seen and treated in our department on 3/18/2019.  He should not participate in gym/sports un